# Patient Record
Sex: FEMALE | Race: WHITE | NOT HISPANIC OR LATINO | Employment: UNEMPLOYED | URBAN - NONMETROPOLITAN AREA
[De-identification: names, ages, dates, MRNs, and addresses within clinical notes are randomized per-mention and may not be internally consistent; named-entity substitution may affect disease eponyms.]

---

## 2017-07-01 ENCOUNTER — HOSPITAL ENCOUNTER (EMERGENCY)
Facility: HOSPITAL | Age: 22
Discharge: HOME | End: 2017-07-01
Attending: STUDENT IN AN ORGANIZED HEALTH CARE EDUCATION/TRAINING PROGRAM | Admitting: STUDENT IN AN ORGANIZED HEALTH CARE EDUCATION/TRAINING PROGRAM

## 2017-07-01 ENCOUNTER — APPOINTMENT (OUTPATIENT)
Dept: GENERAL RADIOLOGY | Facility: HOSPITAL | Age: 22
End: 2017-07-01

## 2017-07-01 VITALS
OXYGEN SATURATION: 98 % | DIASTOLIC BLOOD PRESSURE: 71 MMHG | RESPIRATION RATE: 20 BRPM | TEMPERATURE: 98.5 F | SYSTOLIC BLOOD PRESSURE: 114 MMHG | HEART RATE: 97 BPM | HEIGHT: 67 IN | BODY MASS INDEX: 24.33 KG/M2 | WEIGHT: 155 LBS

## 2017-07-01 DIAGNOSIS — M79.644 PAIN OF FINGER OF RIGHT HAND: Primary | ICD-10-CM

## 2017-07-01 PROCEDURE — 99282 EMERGENCY DEPT VISIT SF MDM: CPT

## 2017-07-01 PROCEDURE — 73140 X-RAY EXAM OF FINGER(S): CPT

## 2017-07-01 NOTE — ED PROVIDER NOTES
Subjective   History of Present Illness  Presents due to the discomfort in her right third finger since 2 weeks ago when she was wrestling.  She feels like it pops from time to time.  No redness or swelling.  No change in sensation.  No change in motor ability.  Review of Systems   All other systems reviewed and are negative.      Past Medical History:   Diagnosis Date   • Anxiety        No Known Allergies    History reviewed. No pertinent surgical history.    History reviewed. No pertinent family history.    Social History     Social History   • Marital status: Single     Spouse name: N/A   • Number of children: N/A   • Years of education: N/A     Social History Main Topics   • Smoking status: Current Every Day Smoker   • Smokeless tobacco: None   • Alcohol use Yes   • Drug use: No   • Sexual activity: Yes     Other Topics Concern   • None     Social History Narrative   • None           Objective   Physical Exam   Constitutional: She is oriented to person, place, and time. She appears well-developed and well-nourished.   HENT:   Head: Normocephalic.   Eyes: EOM are normal. Pupils are equal, round, and reactive to light.   Musculoskeletal: Normal range of motion.   No redness, swelling, or deformity to the right third finger.   Neurological: She is alert and oriented to person, place, and time.       Procedures         ED Course  ED Course      Xray negative for acute abnormality.            MDM    Final diagnoses:   Pain of finger of right hand            Laura Nichols, APRN  07/01/17 1533

## 2020-09-10 ENCOUNTER — HOSPITAL ENCOUNTER (EMERGENCY)
Facility: HOSPITAL | Age: 25
Discharge: HOME | End: 2020-09-10
Attending: EMERGENCY MEDICINE | Admitting: EMERGENCY MEDICINE

## 2020-09-10 VITALS
BODY MASS INDEX: 25.61 KG/M2 | SYSTOLIC BLOOD PRESSURE: 122 MMHG | WEIGHT: 169 LBS | OXYGEN SATURATION: 99 % | DIASTOLIC BLOOD PRESSURE: 67 MMHG | HEIGHT: 68 IN | HEART RATE: 87 BPM | RESPIRATION RATE: 18 BRPM | TEMPERATURE: 98.5 F

## 2020-09-10 DIAGNOSIS — O46.90 VAGINAL BLEEDING DURING PREGNANCY: Primary | ICD-10-CM

## 2020-09-10 LAB
ABO GROUP BLD: NORMAL
BACTERIA UR QL AUTO: ABNORMAL /HPF
BILIRUB UR QL STRIP: NEGATIVE
CLARITY UR: CLEAR
COLOR UR: YELLOW
GLUCOSE UR STRIP-MCNC: NEGATIVE MG/DL
HGB UR QL STRIP.AUTO: ABNORMAL
HYALINE CASTS UR QL AUTO: ABNORMAL /LPF
KETONES UR QL STRIP: NEGATIVE
LEUKOCYTE ESTERASE UR QL STRIP.AUTO: NEGATIVE
MUCOUS THREADS URNS QL MICRO: ABNORMAL /HPF
NITRITE UR QL STRIP: NEGATIVE
PH UR STRIP.AUTO: 5.5 [PH] (ref 5–8)
PROT UR QL STRIP: NEGATIVE
RBC # UR: ABNORMAL /HPF
REF LAB TEST METHOD: ABNORMAL
RH BLD: POSITIVE
SP GR UR STRIP: >=1.03 (ref 1–1.03)
SQUAMOUS #/AREA URNS HPF: ABNORMAL /HPF
UROBILINOGEN UR QL STRIP: ABNORMAL
WBC UR QL AUTO: ABNORMAL /HPF

## 2020-09-10 PROCEDURE — 99283 EMERGENCY DEPT VISIT LOW MDM: CPT

## 2020-09-10 PROCEDURE — 86901 BLOOD TYPING SEROLOGIC RH(D): CPT | Performed by: EMERGENCY MEDICINE

## 2020-09-10 PROCEDURE — 86900 BLOOD TYPING SEROLOGIC ABO: CPT | Performed by: EMERGENCY MEDICINE

## 2020-09-10 PROCEDURE — 87086 URINE CULTURE/COLONY COUNT: CPT | Performed by: EMERGENCY MEDICINE

## 2020-09-10 PROCEDURE — 81001 URINALYSIS AUTO W/SCOPE: CPT | Performed by: EMERGENCY MEDICINE

## 2020-09-11 LAB — BACTERIA SPEC AEROBE CULT: NO GROWTH

## 2020-09-11 NOTE — DISCHARGE INSTRUCTIONS
Contact Dr. Gaines, about tonight's visit. Pelvic rest until follow up. Your blood type was O positive, You do not have a urinary tract infection. Your ultrasound reveals a living single intrauterine pregnancy, consistent with dates.

## 2020-09-11 NOTE — ED PROVIDER NOTES
Subjective   History of Present Illness    Chief Complaint: Vaginal bleeding  History of Present Illness: 25-year-old female G2, P1 presents with vaginal bleeding, reports O+ blood type, onset today.  11 weeks pregnant.  Had confirmatory ultrasound at 9 weeks.  Onset: Today  Duration: Intermittent 2 episodes  Exacerbating / Alleviating factors: None  Associated symptoms: None      Nurses Notes reviewed and agree, including vitals, allergies, social history and prior medical history.     REVIEW OF SYSTEMS: All systems reviewed and not pertinent unless noted.    Positive for: Vaginal bleeding at 11 weeks gestation    Negative for: Pelvic pain, trauma, urinary symptoms  Review of Systems    Past Medical History:   Diagnosis Date   • Anxiety        No Known Allergies    History reviewed. No pertinent surgical history.    History reviewed. No pertinent family history.    Social History     Socioeconomic History   • Marital status: Single     Spouse name: Not on file   • Number of children: Not on file   • Years of education: Not on file   • Highest education level: Not on file   Tobacco Use   • Smoking status: Former Smoker   • Smokeless tobacco: Never Used   Substance and Sexual Activity   • Alcohol use: Not Currently   • Drug use: No   • Sexual activity: Yes           Objective   Physical Exam    GENERAL APPEARANCE: Well developed, healthy-appearing 25-year-old white female,  in acute distress.  VITAL SIGNS: per nursing, reviewed and noted  SKIN: exposed skin with no rashes, ulcerations or petechiae.  Head: Normocephalic, atraumatic.   EYES: perrla. EOMI.  ENT: Normal voice.  Patient maintained wearing a mask throughout patient encounter due to coronavirus pandemic  LUNGS:  No increased work of breathing. No retractions.   CARDIOVASCULAR:  regular rate and rhythm, no murmurs.  Good Peripheral pulses. Good cap refill to extremities.   ABDOMEN: Soft, nontender, normal bowel sounds. No hernia. No  ascites.  MUSCULOSKELETAL:  No tenderness. Full ROM. Strength and tone normal.  NEUROLOGIC: Alert, oriented x 3. No gross deficits. GCS 15.   NECK: Supple, symmetric. No tenderness, no masses. Full ROM  Back: full rom, no paraspinal spasm. No CVA tenderness.   PSYCH: appropriate affect.  : no bladder tenderness or distention, no CVA tenderness      Procedures     Bedside ultrasound  Indications vaginal bleeding in first trimester pregnancy  Transabdominal ultrasound performed by me at bedside reveals a single living intrauterine pregnancy heart rate 140, low-lying placenta, good fetal movement,       ED Course  ED Course as of Sep 11 0621   Thu Sep 10, 2020   2210 RBC, UA(!): 6-12 [PF]   2210 WBC, UA: None Seen [PF]   2211 Bacteria, UA(!): Trace [PF]   2211 Squamous Epithelial Cells, UA(!): 7-12 [PF]   2211 Hyaline Casts, UA: None Seen [PF]   2211 Mucus, UA: Trace [PF]   2211 Blood, UA(!): Small (1+) [PF]   2211 Protein, UA: Negative [PF]   2211 Leukocytes, UA: Negative [PF]   2211 Nitrite, UA: Negative [PF]   2211 Urobilinogen, UA: 1.0 E.U./dL [PF]   2211 Bilirubin, UA: Negative [PF]   2211 Ketones, UA: Negative [PF]   2211 Glucose: Negative [PF]   2211 Specific Gravity, UA: >=1.030 [PF]   2211 pH, UA: 5.5 [PF]   2211 Appearance, UA: Clear [PF]   2211 Color, UA: Yellow [PF]   2230 ABO Type: O [PF]   2230 RH type: Positive [PF]      ED Course User Index  [PF] Mati Nino DO                                           MDM  Blood type O+.  Reassuring ultrasound.  Patient nontoxic.  Advised pelvic rest, outpatient follow-up with her gynecologist.  Return precautions were discussed.  Final diagnoses:   Vaginal bleeding during pregnancy            Mati Nino DO  09/11/20 0621